# Patient Record
Sex: FEMALE | Race: WHITE | Employment: OTHER | URBAN - METROPOLITAN AREA
[De-identification: names, ages, dates, MRNs, and addresses within clinical notes are randomized per-mention and may not be internally consistent; named-entity substitution may affect disease eponyms.]

---

## 2017-11-09 ENCOUNTER — OFFICE VISIT (OUTPATIENT)
Dept: NEUROLOGY | Age: 77
End: 2017-11-09
Payer: MEDICARE

## 2017-11-09 VITALS
WEIGHT: 124 LBS | DIASTOLIC BLOOD PRESSURE: 66 MMHG | HEART RATE: 61 BPM | SYSTOLIC BLOOD PRESSURE: 114 MMHG | HEIGHT: 63 IN | BODY MASS INDEX: 21.97 KG/M2

## 2017-11-09 DIAGNOSIS — M47.816 SPONDYLOSIS OF LUMBAR REGION WITHOUT MYELOPATHY OR RADICULOPATHY: Primary | ICD-10-CM

## 2017-11-09 PROCEDURE — 4040F PNEUMOC VAC/ADMIN/RCVD: CPT | Performed by: PSYCHIATRY & NEUROLOGY

## 2017-11-09 PROCEDURE — 99214 OFFICE O/P EST MOD 30 MIN: CPT | Performed by: PSYCHIATRY & NEUROLOGY

## 2017-11-09 PROCEDURE — G8427 DOCREV CUR MEDS BY ELIG CLIN: HCPCS | Performed by: PSYCHIATRY & NEUROLOGY

## 2017-11-09 PROCEDURE — G8484 FLU IMMUNIZE NO ADMIN: HCPCS | Performed by: PSYCHIATRY & NEUROLOGY

## 2017-11-09 PROCEDURE — 1036F TOBACCO NON-USER: CPT | Performed by: PSYCHIATRY & NEUROLOGY

## 2017-11-09 PROCEDURE — G8400 PT W/DXA NO RESULTS DOC: HCPCS | Performed by: PSYCHIATRY & NEUROLOGY

## 2017-11-09 PROCEDURE — G8420 CALC BMI NORM PARAMETERS: HCPCS | Performed by: PSYCHIATRY & NEUROLOGY

## 2017-11-09 PROCEDURE — 1123F ACP DISCUSS/DSCN MKR DOCD: CPT | Performed by: PSYCHIATRY & NEUROLOGY

## 2017-11-09 PROCEDURE — 1090F PRES/ABSN URINE INCON ASSESS: CPT | Performed by: PSYCHIATRY & NEUROLOGY

## 2017-11-09 RX ORDER — GABAPENTIN 300 MG/1
CAPSULE ORAL
Qty: 240 CAPSULE | Refills: 11 | Status: SHIPPED | OUTPATIENT
Start: 2017-11-09 | End: 2018-11-15 | Stop reason: SDUPTHER

## 2017-11-09 ASSESSMENT — ENCOUNTER SYMPTOMS
EYES NEGATIVE: 1
GASTROINTESTINAL NEGATIVE: 1
RESPIRATORY NEGATIVE: 1
BACK PAIN: 1

## 2017-11-09 NOTE — PROGRESS NOTES
tablet Take 25 mg by mouth 2 times daily      aspirin 81 MG tablet Take 81 mg by mouth daily as needed       VITAMIN B1-B12 PO Take 2 tablets by mouth 2 times daily.  acetaminophen (TYLENOL) 500 MG tablet Take 500 mg by mouth daily as needed for Pain      gabapentin (NEURONTIN) 300 MG capsule Take 2 capsules by mouth 4 times daily. 240 capsule 1     No current facility-administered medications for this visit. Allergies   Allergen Reactions    Sulfa Antibiotics            Review of Systems   Constitutional: Negative. HENT: Positive for hearing loss. Eyes: Negative. Respiratory: Negative. Cardiovascular: Positive for leg swelling. Gastrointestinal: Negative. Endocrine: Negative. Genitourinary: Negative. Musculoskeletal: Positive for back pain and neck pain. Skin: Negative. Neurological: Negative. Hematological: Negative. Psychiatric/Behavioral: Negative. Objective:   Physical Exam    Vitals:    11/09/17 0953   BP: 114/66   Pulse: 61       Neurological Examination  Constitutional .General exam well groomed   Ears /Nose/Throat: external ear . Normal exam  Neck and thyroid . Normal size  Respiratory . Breathsounds clear bilaterally  Cardiovascular: Auscultation of heart with regular rate and rhythm and normal heart sounds  Musculoskeletal. Muscle bulk and tone normal                                                                 Muscle strength 5/5 strength throughout                                                                                 No dysmetria or dysdiadokinesis  No tremor   Normal fine motor  Gait normal   Orientation Alert and oriented x 3   Short term memory normal  Attention and concentration normal   Language process and speech normal . No aphasia   Cranial nerve 2 normal acuety and visual fields  Cranial nerve 3, 4 and 6 . Extraocular muscles are intact . Pupils are equal and reactive   Cranial nerve 5 . Normal strength of masseter and temporalis . Intact corneal reflex. Normal facial sensation  Cranial nerve 7 normal exam   Cranial nerve 8. Grossly intact hearing   Cranial nerve 9 and 10. Symmetric palate elevation   Cranial nerve 11 , 5 out of 5 strength   Cranial Nerve 12 midline tongue . No atrophy  Sensation . Normal proprioception . Intact Vibration . Normal pinprick and light touch   Deep Tendon Reflexes normal  Plantar response flexor bilaterally      Assessment:      1.  Spondylosis of lumbar region without myelopathy or radiculopathy      Patient is to remain on current medication regimen       Plan:      As above

## 2017-11-09 NOTE — LETTER
Social History Main Topics    Smoking status: Never Smoker    Smokeless tobacco: Never Used    Alcohol use No    Drug use: No    Sexual activity: Not Asked     Other Topics Concern    None     Social History Narrative    None       Current Outpatient Prescriptions   Medication Sig Dispense Refill    gabapentin (NEURONTIN) 300 MG capsule Take 2 capsules by mouth 4 times daily. 240 capsule 11    spironolactone (ALDACTONE) 25 MG tablet Take 25 mg by mouth 2 times daily      aspirin 81 MG tablet Take 81 mg by mouth daily as needed       VITAMIN B1-B12 PO Take 2 tablets by mouth 2 times daily.  acetaminophen (TYLENOL) 500 MG tablet Take 500 mg by mouth daily as needed for Pain      gabapentin (NEURONTIN) 300 MG capsule Take 2 capsules by mouth 4 times daily. 240 capsule 1     No current facility-administered medications for this visit. Allergies   Allergen Reactions    Sulfa Antibiotics            Review of Systems   Constitutional: Negative. HENT: Positive for hearing loss. Eyes: Negative. Respiratory: Negative. Cardiovascular: Positive for leg swelling. Gastrointestinal: Negative. Endocrine: Negative. Genitourinary: Negative. Musculoskeletal: Positive for back pain and neck pain. Skin: Negative. Neurological: Negative. Hematological: Negative. Psychiatric/Behavioral: Negative. Objective:   Physical Exam    Vitals:    11/09/17 0953   BP: 114/66   Pulse: 61       Neurological Examination  Constitutional .General exam well groomed   Ears /Nose/Throat: external ear . Normal exam  Neck and thyroid . Normal size  Respiratory . Breathsounds clear bilaterally  Cardiovascular:  Auscultation of heart with regular rate and rhythm and normal heart sounds  Musculoskeletal. Muscle bulk and tone normal                                                                 Muscle strength 5/5 strength throughout

## 2017-11-14 NOTE — COMMUNICATION BODY
. Intact corneal reflex. Normal facial sensation  Cranial nerve 7 normal exam   Cranial nerve 8. Grossly intact hearing   Cranial nerve 9 and 10. Symmetric palate elevation   Cranial nerve 11 , 5 out of 5 strength   Cranial Nerve 12 midline tongue . No atrophy  Sensation . Normal proprioception . Intact Vibration . Normal pinprick and light touch   Deep Tendon Reflexes normal  Plantar response flexor bilaterally      Assessment:      1.  Spondylosis of lumbar region without myelopathy or radiculopathy      Patient is to remain on current medication regimen       Plan:      As above

## 2018-11-15 ENCOUNTER — OFFICE VISIT (OUTPATIENT)
Dept: NEUROLOGY | Age: 78
End: 2018-11-15
Payer: MEDICARE

## 2018-11-15 VITALS
BODY MASS INDEX: 23.6 KG/M2 | SYSTOLIC BLOOD PRESSURE: 124 MMHG | HEIGHT: 61 IN | HEART RATE: 70 BPM | WEIGHT: 125 LBS | DIASTOLIC BLOOD PRESSURE: 67 MMHG

## 2018-11-15 DIAGNOSIS — M47.816 SPONDYLOSIS OF LUMBAR REGION WITHOUT MYELOPATHY OR RADICULOPATHY: Primary | ICD-10-CM

## 2018-11-15 PROCEDURE — 99214 OFFICE O/P EST MOD 30 MIN: CPT | Performed by: PSYCHIATRY & NEUROLOGY

## 2018-11-15 PROCEDURE — G8400 PT W/DXA NO RESULTS DOC: HCPCS | Performed by: PSYCHIATRY & NEUROLOGY

## 2018-11-15 PROCEDURE — 4040F PNEUMOC VAC/ADMIN/RCVD: CPT | Performed by: PSYCHIATRY & NEUROLOGY

## 2018-11-15 PROCEDURE — 1036F TOBACCO NON-USER: CPT | Performed by: PSYCHIATRY & NEUROLOGY

## 2018-11-15 PROCEDURE — G8420 CALC BMI NORM PARAMETERS: HCPCS | Performed by: PSYCHIATRY & NEUROLOGY

## 2018-11-15 PROCEDURE — 1101F PT FALLS ASSESS-DOCD LE1/YR: CPT | Performed by: PSYCHIATRY & NEUROLOGY

## 2018-11-15 PROCEDURE — 1090F PRES/ABSN URINE INCON ASSESS: CPT | Performed by: PSYCHIATRY & NEUROLOGY

## 2018-11-15 PROCEDURE — G8484 FLU IMMUNIZE NO ADMIN: HCPCS | Performed by: PSYCHIATRY & NEUROLOGY

## 2018-11-15 PROCEDURE — 1123F ACP DISCUSS/DSCN MKR DOCD: CPT | Performed by: PSYCHIATRY & NEUROLOGY

## 2018-11-15 PROCEDURE — G8427 DOCREV CUR MEDS BY ELIG CLIN: HCPCS | Performed by: PSYCHIATRY & NEUROLOGY

## 2018-11-15 RX ORDER — GABAPENTIN 300 MG/1
CAPSULE ORAL
Qty: 240 CAPSULE | Refills: 11 | Status: SHIPPED | OUTPATIENT
Start: 2018-11-15 | End: 2019-11-19 | Stop reason: SDUPTHER

## 2018-11-15 ASSESSMENT — ENCOUNTER SYMPTOMS
BACK PAIN: 1
CONSTIPATION: 1
EYES NEGATIVE: 1
RESPIRATORY NEGATIVE: 1

## 2018-11-15 NOTE — LETTER
Social History    Marital status:      Spouse name: N/A    Number of children: N/A    Years of education: N/A     Social History Main Topics    Smoking status: Never Smoker    Smokeless tobacco: Never Used    Alcohol use No    Drug use: No    Sexual activity: Not Asked     Other Topics Concern    None     Social History Narrative    None       Current Outpatient Prescriptions   Medication Sig Dispense Refill    gabapentin (NEURONTIN) 300 MG capsule Take 2 capsules by mouth 4 times daily. . 240 capsule 11    spironolactone (ALDACTONE) 25 MG tablet Take 25 mg by mouth 2 times daily      acetaminophen (TYLENOL) 500 MG tablet Take 500 mg by mouth daily as needed for Pain      gabapentin (NEURONTIN) 300 MG capsule Take 2 capsules by mouth 4 times daily. 240 capsule 1    aspirin 81 MG tablet Take 81 mg by mouth daily as needed       VITAMIN B1-B12 PO Take 2 tablets by mouth 2 times daily. No current facility-administered medications for this visit. Allergies   Allergen Reactions    Sulfa Antibiotics            Review of Systems   Constitutional: Positive for fever. HENT: Positive for hearing loss. Eyes: Negative. Respiratory: Negative. Cardiovascular: Negative. Gastrointestinal: Positive for constipation. Endocrine: Negative. Genitourinary: Negative. Musculoskeletal: Positive for back pain, gait problem, myalgias and neck pain. Skin: Negative. Neurological: Positive for numbness. Hematological: Negative. Psychiatric/Behavioral: Negative. Objective:   Physical Exam    Vitals:    11/15/18 1009   BP: 124/67   Pulse: 70       Neurological Examination  Constitutional .General exam well groomed   Ears /Nose/Throat: external ear . Normal exam  Neck and thyroid . Normal size  Respiratory . Breathsounds clear bilaterally  Cardiovascular:  Auscultation of heart with regular rate and rhythm and normal heart sounds

## 2018-11-15 NOTE — PROGRESS NOTES
(NEURONTIN) 300 MG capsule Take 2 capsules by mouth 4 times daily. . 240 capsule 11    spironolactone (ALDACTONE) 25 MG tablet Take 25 mg by mouth 2 times daily      acetaminophen (TYLENOL) 500 MG tablet Take 500 mg by mouth daily as needed for Pain      gabapentin (NEURONTIN) 300 MG capsule Take 2 capsules by mouth 4 times daily. 240 capsule 1    aspirin 81 MG tablet Take 81 mg by mouth daily as needed       VITAMIN B1-B12 PO Take 2 tablets by mouth 2 times daily. No current facility-administered medications for this visit. Allergies   Allergen Reactions    Sulfa Antibiotics            Review of Systems   Constitutional: Positive for fever. HENT: Positive for hearing loss. Eyes: Negative. Respiratory: Negative. Cardiovascular: Negative. Gastrointestinal: Positive for constipation. Endocrine: Negative. Genitourinary: Negative. Musculoskeletal: Positive for back pain, gait problem, myalgias and neck pain. Skin: Negative. Neurological: Positive for numbness. Hematological: Negative. Psychiatric/Behavioral: Negative. Objective:   Physical Exam    Vitals:    11/15/18 1009   BP: 124/67   Pulse: 70       Neurological Examination  Constitutional .General exam well groomed   Ears /Nose/Throat: external ear . Normal exam  Neck and thyroid . Normal size  Respiratory . Breathsounds clear bilaterally  Cardiovascular:  Auscultation of heart with regular rate and rhythm and normal heart sounds  Musculoskeletal. Muscle bulk and tone normal                                                                 Muscle strength 5/5 strength throughout                                                                                 No dysmetria or dysdiadokinesis  No tremor   Normal fine motor  Gait normal   Orientation Alert and oriented x 3   Short term memory normal  Attention and concentration normal   Language process and speech normal . No aphasia   Cranial nerve 2 normal acuety and visual fields  Cranial nerve 3, 4 and 6 . Extraocular muscles are intact . Pupils are equal and reactive   Cranial nerve 5 . Normal strength of masseter and temporalis . Intact corneal reflex. Normal facial sensation  Cranial nerve 7 normal exam   Cranial nerve 8. Grossly intact hearing   Cranial nerve 9 and 10. Symmetric palate elevation   Cranial nerve 11 , 5 out of 5 strength   Cranial Nerve 12 midline tongue . No atrophy  Sensation . Normal proprioception . Intact Vibration . Normal pinprick and light touch   Deep Tendon Reflexes normal  Plantar response flexor bilaterally      Assessment:      1.  Spondylosis of lumbar region without myelopathy or radiculopathy      Patient is to remain on current medication regimen       Plan:      As above

## 2019-11-19 ENCOUNTER — OFFICE VISIT (OUTPATIENT)
Dept: NEUROLOGY | Age: 79
End: 2019-11-19
Payer: MEDICARE

## 2019-11-19 VITALS
WEIGHT: 123.4 LBS | HEART RATE: 67 BPM | DIASTOLIC BLOOD PRESSURE: 70 MMHG | SYSTOLIC BLOOD PRESSURE: 116 MMHG | HEIGHT: 63 IN | BODY MASS INDEX: 21.86 KG/M2

## 2019-11-19 DIAGNOSIS — M47.816 SPONDYLOSIS OF LUMBAR REGION WITHOUT MYELOPATHY OR RADICULOPATHY: Primary | ICD-10-CM

## 2019-11-19 PROCEDURE — 1123F ACP DISCUSS/DSCN MKR DOCD: CPT | Performed by: PSYCHIATRY & NEUROLOGY

## 2019-11-19 PROCEDURE — 99214 OFFICE O/P EST MOD 30 MIN: CPT | Performed by: PSYCHIATRY & NEUROLOGY

## 2019-11-19 PROCEDURE — 1036F TOBACCO NON-USER: CPT | Performed by: PSYCHIATRY & NEUROLOGY

## 2019-11-19 PROCEDURE — G8420 CALC BMI NORM PARAMETERS: HCPCS | Performed by: PSYCHIATRY & NEUROLOGY

## 2019-11-19 PROCEDURE — G8484 FLU IMMUNIZE NO ADMIN: HCPCS | Performed by: PSYCHIATRY & NEUROLOGY

## 2019-11-19 PROCEDURE — 1090F PRES/ABSN URINE INCON ASSESS: CPT | Performed by: PSYCHIATRY & NEUROLOGY

## 2019-11-19 PROCEDURE — 4040F PNEUMOC VAC/ADMIN/RCVD: CPT | Performed by: PSYCHIATRY & NEUROLOGY

## 2019-11-19 PROCEDURE — G8400 PT W/DXA NO RESULTS DOC: HCPCS | Performed by: PSYCHIATRY & NEUROLOGY

## 2019-11-19 PROCEDURE — G8427 DOCREV CUR MEDS BY ELIG CLIN: HCPCS | Performed by: PSYCHIATRY & NEUROLOGY

## 2019-11-19 RX ORDER — GABAPENTIN 300 MG/1
CAPSULE ORAL
Qty: 240 CAPSULE | Refills: 11 | Status: SHIPPED | OUTPATIENT
Start: 2019-11-19 | End: 2020-11-19 | Stop reason: SDUPTHER

## 2019-11-19 ASSESSMENT — ENCOUNTER SYMPTOMS
RESPIRATORY NEGATIVE: 1
EYES NEGATIVE: 1
DIARRHEA: 1
BACK PAIN: 1

## 2020-11-19 ENCOUNTER — OFFICE VISIT (OUTPATIENT)
Dept: NEUROLOGY | Age: 80
End: 2020-11-19
Payer: MEDICARE

## 2020-11-19 VITALS
DIASTOLIC BLOOD PRESSURE: 64 MMHG | HEIGHT: 61 IN | BODY MASS INDEX: 23.03 KG/M2 | WEIGHT: 122 LBS | HEART RATE: 69 BPM | SYSTOLIC BLOOD PRESSURE: 112 MMHG | TEMPERATURE: 97.6 F

## 2020-11-19 PROCEDURE — 1123F ACP DISCUSS/DSCN MKR DOCD: CPT | Performed by: PSYCHIATRY & NEUROLOGY

## 2020-11-19 PROCEDURE — G8400 PT W/DXA NO RESULTS DOC: HCPCS | Performed by: PSYCHIATRY & NEUROLOGY

## 2020-11-19 PROCEDURE — 1090F PRES/ABSN URINE INCON ASSESS: CPT | Performed by: PSYCHIATRY & NEUROLOGY

## 2020-11-19 PROCEDURE — G8420 CALC BMI NORM PARAMETERS: HCPCS | Performed by: PSYCHIATRY & NEUROLOGY

## 2020-11-19 PROCEDURE — 1036F TOBACCO NON-USER: CPT | Performed by: PSYCHIATRY & NEUROLOGY

## 2020-11-19 PROCEDURE — 4040F PNEUMOC VAC/ADMIN/RCVD: CPT | Performed by: PSYCHIATRY & NEUROLOGY

## 2020-11-19 PROCEDURE — G8427 DOCREV CUR MEDS BY ELIG CLIN: HCPCS | Performed by: PSYCHIATRY & NEUROLOGY

## 2020-11-19 PROCEDURE — 99214 OFFICE O/P EST MOD 30 MIN: CPT | Performed by: PSYCHIATRY & NEUROLOGY

## 2020-11-19 PROCEDURE — G8484 FLU IMMUNIZE NO ADMIN: HCPCS | Performed by: PSYCHIATRY & NEUROLOGY

## 2020-11-19 RX ORDER — ASPIRIN 325 MG
325 TABLET ORAL PRN
COMMUNITY

## 2020-11-19 RX ORDER — ALENDRONATE SODIUM 70 MG/1
TABLET ORAL
COMMUNITY
Start: 2020-09-03

## 2020-11-19 RX ORDER — GABAPENTIN 300 MG/1
CAPSULE ORAL
Qty: 240 CAPSULE | Refills: 5 | Status: SHIPPED | OUTPATIENT
Start: 2020-11-19 | End: 2021-05-10

## 2020-11-19 ASSESSMENT — ENCOUNTER SYMPTOMS
BACK PAIN: 1
RESPIRATORY NEGATIVE: 1
EYES NEGATIVE: 1
DIARRHEA: 1

## 2020-11-19 NOTE — PROGRESS NOTES
Subjective:      Patient ID: Lino Nguyễn is a 78 y.o. female. HPI  Active problem lumbar spondylosis on neurontin . The condition is she reports that her pain in low back is at mild degree on neurontin . The pain is in mid low back area with no leg radiation which may be aggravated with activity . She does find that neurontin will attenuate pain at 600 mg po qid tolerating this well . If she forgets to take medication low back pain pain will get worse not wanting to cut back on dosage . Pain has not been responsive to physical therapy or TENS unit . There is no leg weakness or numbness although may have some urinary hesitation. There is intermittent tingling of her legs able to shake off  There is occasional nocturnal leg cramp . Significant medications neurontin 600 mg po qid . Testing MRI of lumbar spine with L4-5 disc degeneration with bulging with mild foraminal narrowing with posterior facet arthropathy .  At L5-S1 there is severe degeneration with annular bulging , October 2008      Past Medical History:   Diagnosis Date    Cataract     COVID-19 11/01/2020    Lumbosacral spondylosis without myelopathy     Osteoporosis        Past Surgical History:   Procedure Laterality Date    HYSTERECTOMY      OTHER SURGICAL HISTORY  July 2012    for sciatica    THYROID SURGERY  08/14/2018       Family History   Problem Relation Age of Onset    Diabetes Mother     Diabetes Sister     Diabetes Brother     Stroke Brother     Diabetes Son        Social History     Socioeconomic History    Marital status:      Spouse name: None    Number of children: None    Years of education: None    Highest education level: None   Occupational History    None   Social Needs    Financial resource strain: None    Food insecurity     Worry: None     Inability: None    Transportation needs     Medical: None     Non-medical: None   Tobacco Use    Smoking status: Never Smoker    Smokeless tobacco: Never Used gait problem, myalgias and neck pain. Skin: Negative. Neurological: Positive for numbness. Hematological: Negative. Psychiatric/Behavioral: Negative. Objective:   Physical Exam  Vitals:    11/19/20 1020   BP: 112/64   Pulse: 69   Temp: 97.6 °F (36.4 °C)       Neurological Examination  Constitutional .General exam well groomed   Ears /Nose/Throat: external ear . Normal exam  Neck and thyroid . Normal size  Respiratory . Breathsounds clear bilaterally  Cardiovascular: Auscultation of heart with regular rate and rhythm and normal heart sounds  Musculoskeletal. Muscle bulk and tone normal                                                                 Muscle strength 5/5 strength throughout                                                                                 No dysmetria or dysdiadokinesis  No tremor   Normal fine motor  Gait normal   Orientation Alert and oriented x 3   Short term memory normal  Attention and concentration normal   Language process and speech normal . No aphasia   Cranial nerve 2 normal acuety and visual fields  Cranial nerve 3, 4 and 6 . Extraocular muscles are intact . Pupils are equal and reactive   Cranial nerve 5 . Normal strength of masseter and temporalis . Intact corneal reflex. Normal facial sensation  Cranial nerve 7 normal exam   Cranial nerve 8. Grossly intact hearing   Cranial nerve 9 and 10. Symmetric palate elevation   Cranial nerve 11 , 5 out of 5 strength   Cranial Nerve 12 midline tongue . No atrophy  Sensation . Normal proprioception . Intact Vibration . Normal pinprick and light touch   Deep Tendon Reflexes normal  Plantar response flexor bilaterally      Assessment:      1. Spondylosis of lumbar region without myelopathy or radiculopathy    2.  Low back pain with sciatica, sciatica laterality unspecified, unspecified back pain laterality, unspecified chronicity      Patient is to remain on current medication regimen       Plan:      As above

## 2021-05-10 NOTE — TELEPHONE ENCOUNTER
Pharmacy requesting refill of Gabapentin.       Medication active on med list: yes      Date of last fill: 11/19/20 for #240 and 5 refills    verified on 5/10/2021    verified by Thomas Gonzalez LPN      Date of last appointment: 11/19/2020    Next Visit Date: 11/18/2021

## 2021-05-11 RX ORDER — GABAPENTIN 300 MG/1
CAPSULE ORAL
Qty: 240 CAPSULE | Refills: 5 | Status: SHIPPED | OUTPATIENT
Start: 2021-05-18 | End: 2021-12-23 | Stop reason: SDUPTHER

## 2021-12-23 RX ORDER — GABAPENTIN 300 MG/1
600 CAPSULE ORAL 4 TIMES DAILY
Qty: 720 CAPSULE | Refills: 0 | Status: SHIPPED | OUTPATIENT
Start: 2021-12-23 | End: 2022-03-07 | Stop reason: SDUPTHER

## 2021-12-23 NOTE — TELEPHONE ENCOUNTER
Patient calling for refill of Gabapentin. Medication active on med list yes      Date of last fill: 5/18/21  verified on 12/23/2021   verified by [facility] LPN      Date of last appointment 11/19/2020    Next Visit Date:  3/7/2022    We rescheduled pt's appt from today. She is in need of her Gabapentin.

## 2021-12-28 ENCOUNTER — VIRTUAL VISIT (OUTPATIENT)
Dept: NEUROLOGY | Age: 81
End: 2021-12-28
Payer: MEDICARE

## 2021-12-28 DIAGNOSIS — M54.40 LOW BACK PAIN WITH SCIATICA, SCIATICA LATERALITY UNSPECIFIED, UNSPECIFIED BACK PAIN LATERALITY, UNSPECIFIED CHRONICITY: ICD-10-CM

## 2021-12-28 DIAGNOSIS — M47.816 LUMBAR SPONDYLOSIS: Primary | ICD-10-CM

## 2021-12-28 PROCEDURE — 99214 OFFICE O/P EST MOD 30 MIN: CPT | Performed by: PSYCHIATRY & NEUROLOGY

## 2021-12-28 ASSESSMENT — ENCOUNTER SYMPTOMS
BACK PAIN: 1
EYES NEGATIVE: 1
DIARRHEA: 1
RESPIRATORY NEGATIVE: 1

## 2021-12-28 NOTE — PROGRESS NOTES
Never used   Substance and Sexual Activity    Alcohol use: No     Alcohol/week: 0.0 standard drinks    Drug use: No    Sexual activity: None   Other Topics Concern    None   Social History Narrative    None     Social Determinants of Health     Financial Resource Strain:     Difficulty of Paying Living Expenses: Not on file   Food Insecurity:     Worried About Running Out of Food in the Last Year: Not on file    Petra of Food in the Last Year: Not on file   Transportation Needs:     Lack of Transportation (Medical): Not on file    Lack of Transportation (Non-Medical): Not on file   Physical Activity:     Days of Exercise per Week: Not on file    Minutes of Exercise per Session: Not on file   Stress:     Feeling of Stress : Not on file   Social Connections:     Frequency of Communication with Friends and Family: Not on file    Frequency of Social Gatherings with Friends and Family: Not on file    Attends Alevism Services: Not on file    Active Member of [address] [place] [address] or Organizations: Not on file    Attends Club or Organization Meetings: Not on file    Marital Status: Not on file   Intimate Partner Violence:     Fear of Current or Ex-Partner: Not on file    Emotionally Abused: Not on file    Physically Abused: Not on file    Sexually Abused: Not on file   Housing Stability:     Unable to Pay for Housing in the Last Year: Not on file    Number of [place] in the Last Year: Not on file    Unstable Housing in the Last Year: Not on file       Current Outpatient Medications   Medication Sig Dispense Refill    gabapentin (NEURONTIN) 300 MG capsule Take 2 capsules by mouth 4 times daily for 90 days.  720 capsule 0    aspirin 325 MG tablet Take 325 mg by mouth as needed for Pain      SIMETHICONE 40 MG/0.6 ML ORAL SUSP CMPD Take 1 tablet by mouth 4 times daily as needed      spironolactone (ALDACTONE) 25 MG tablet Take 25 mg by mouth 2 times daily      acetaminophen (TYLENOL) 500 MG tablet Take 500 mg by mouth daily as needed for Pain      VITAMIN B1-B12 PO Take 2 tablets by mouth 2 times daily.  alendronate (FOSAMAX) 70 MG tablet TAKE ONE TABLET ONCE WEEKLY ON AN EMPTY STOMACH, WITH FULL GLASS OF WATER. DON'T EAT OR LIE DOWN FOR 30 MINUTES (Patient not taking: Reported on 12/27/2021)      aspirin 81 MG tablet Take 81 mg by mouth daily as needed  (Patient not taking: Reported on 12/27/2021)       No current facility-administered medications for this visit. Allergies   Allergen Reactions    Ciprofloxacin     Sulfa Antibiotics            Review of Systems   Constitutional: Negative. HENT: Positive for hearing loss. Eyes: Negative. Respiratory: Negative. Cardiovascular: Negative. Gastrointestinal: Positive for diarrhea. Endocrine: Negative. Genitourinary: Negative. Musculoskeletal: Positive for back pain, gait problem, myalgias and neck pain. Skin: Negative. Neurological: Positive for numbness. Hematological: Negative. Psychiatric/Behavioral: Negative. Objective:   Physical Exam  There were no vitals filed for this visit. Neurological Examination  Constitutional .General exam well groomed   Ears /Nose/Throat: external ear . Normal exam  Neck and thyroid . Normal size  Respiratory . Breathsounds clear bilaterally  Cardiovascular: Auscultation of heart with regular rate and rhythm and normal heart sounds  Musculoskeletal. Muscle bulk and tone normal                                                                 Muscle strength 5/5 strength throughout                                                                                 No dysmetria or dysdiadokinesis  No tremor   Normal fine motor  Gait normal   Orientation Alert and oriented x 3   Short term memory normal  Attention and concentration normal   Language process and speech normal . No aphasia   Cranial nerve 2 normal acuety and visual fields  Cranial nerve 3, 4 and 6 . Extraocular muscles are intact . Pupils are equal and reactive   Cranial nerve 5 . Normal strength of masseter and temporalis . Intact corneal reflex. Normal facial sensation  Cranial nerve 7 normal exam   Cranial nerve 8. Grossly intact hearing   Cranial nerve 9 and 10. Symmetric palate elevation   Cranial nerve 11 , 5 out of 5 strength   Cranial Nerve 12 midline tongue . No atrophy  Sensation . Normal proprioception . Intact Vibration . Normal pinprick and light touch   Deep Tendon Reflexes normal  Plantar response flexor bilaterally      Assessment:      1. Lumbar spondylosis    2. Low back pain with sciatica, sciatica laterality unspecified, unspecified back pain laterality, unspecified chronicity      Patient is to continue current medication regimen       Plan:      As above       [facility], was evaluated through a synchronous (real-time) audio-video encounter. The patient (or guardian if applicable) is aware that this is a billable service. Verbal consent to proceed has been obtained within the past 12 months. The visit was conducted pursuant to the emergency declaration under the [address] [place] [address] authority and the [employer] and [employer] General Act. Patient identification was verified, and a caregiver was present when appropriate. The patient was located in a state where the provider was credentialed to provide care. --Mackenzie Clinton MD on 12/29/2021 at 4:50 PM    An electronic signature was used to authenticate this note.

## 2022-03-07 ENCOUNTER — TELEMEDICINE (OUTPATIENT)
Dept: NEUROLOGY | Age: 82
End: 2022-03-07
Payer: MEDICARE

## 2022-03-07 DIAGNOSIS — M54.40 LOW BACK PAIN WITH SCIATICA, SCIATICA LATERALITY UNSPECIFIED, UNSPECIFIED BACK PAIN LATERALITY, UNSPECIFIED CHRONICITY: ICD-10-CM

## 2022-03-07 DIAGNOSIS — M47.816 LUMBAR SPONDYLOSIS: Primary | ICD-10-CM

## 2022-03-07 PROCEDURE — G8421 BMI NOT CALCULATED: HCPCS | Performed by: PSYCHIATRY & NEUROLOGY

## 2022-03-07 PROCEDURE — G8484 FLU IMMUNIZE NO ADMIN: HCPCS | Performed by: PSYCHIATRY & NEUROLOGY

## 2022-03-07 PROCEDURE — G8400 PT W/DXA NO RESULTS DOC: HCPCS | Performed by: PSYCHIATRY & NEUROLOGY

## 2022-03-07 PROCEDURE — G8428 CUR MEDS NOT DOCUMENT: HCPCS | Performed by: PSYCHIATRY & NEUROLOGY

## 2022-03-07 PROCEDURE — 4040F PNEUMOC VAC/ADMIN/RCVD: CPT | Performed by: PSYCHIATRY & NEUROLOGY

## 2022-03-07 PROCEDURE — 1036F TOBACCO NON-USER: CPT | Performed by: PSYCHIATRY & NEUROLOGY

## 2022-03-07 PROCEDURE — 99214 OFFICE O/P EST MOD 30 MIN: CPT | Performed by: PSYCHIATRY & NEUROLOGY

## 2022-03-07 PROCEDURE — 1123F ACP DISCUSS/DSCN MKR DOCD: CPT | Performed by: PSYCHIATRY & NEUROLOGY

## 2022-03-07 PROCEDURE — 1090F PRES/ABSN URINE INCON ASSESS: CPT | Performed by: PSYCHIATRY & NEUROLOGY

## 2022-03-07 RX ORDER — GABAPENTIN 300 MG/1
600 CAPSULE ORAL 4 TIMES DAILY
Qty: 720 CAPSULE | Refills: 0 | Status: SHIPPED | OUTPATIENT
Start: 2022-03-07 | End: 2022-05-27

## 2022-03-07 ASSESSMENT — ENCOUNTER SYMPTOMS
DIARRHEA: 1
BACK PAIN: 1
RESPIRATORY NEGATIVE: 1
EYES NEGATIVE: 1

## 2022-03-07 NOTE — PROGRESS NOTES
Subjective:      Patient ID: Anny Tello is a 78 y.o. female. HPI  Active problem lumbar spondylosis on neurontin . The condition is she is on neurontin 600mg po qid 6AM , 12 noon , 5PM and 10 PM . She reports that medication will attenuate back pain which is of burning quality although reports dosing may not always not last until next scheduled dose taking pill earlier . For the larger part she reports adequate pain control . She denies radiation of pain down leg  . She has ran out of neurontin previously reporting disruptive pain being off this medication. Pain has not been responsive to physical therapy or TENS unit . There is no leg weakness or numbness although may have some urinary hesitation. There is intermittent tingling of her legs able to shake off  There is occasional nocturnal leg cramp . Significant medications neurontin 600 mg po qid . Testing MRI of lumbar spine with L4-5 disc degeneration with bulging with mild foraminal narrowing with posterior facet arthropathy .  At L5-S1 there is severe degeneration with annular bulging , October 2008      Past Medical History:   Diagnosis Date    Cataract     COVID-19 11/01/2020    Lumbosacral spondylosis without myelopathy     Osteoporosis        Past Surgical History:   Procedure Laterality Date    HYSTERECTOMY      OTHER SURGICAL HISTORY  July 2012    for sciatica    THYROID SURGERY  08/14/2018       Family History   Problem Relation Age of Onset    Diabetes Mother     Diabetes Sister     Diabetes Brother     Stroke Brother     Diabetes Son        Social History     Socioeconomic History    Marital status:      Spouse name: Not on file    Number of children: Not on file    Years of education: Not on file    Highest education level: Not on file   Occupational History    Not on file   Tobacco Use    Smoking status: Never Smoker    Smokeless tobacco: Never Used   Vaping Use    Vaping Use: Never used   Substance and Sexual Activity  Alcohol use: No     Alcohol/week: 0.0 standard drinks    Drug use: No    Sexual activity: Not on file   Other Topics Concern    Not on file   Social History Narrative    Not on file     Social Determinants of Health     Financial Resource Strain:     Difficulty of Paying Living Expenses: Not on file   Food Insecurity:     Worried About Running Out of Food in the Last Year: Not on file    Petra of Food in the Last Year: Not on file   Transportation Needs:     Lack of Transportation (Medical): Not on file    Lack of Transportation (Non-Medical): Not on file   Physical Activity:     Days of Exercise per Week: Not on file    Minutes of Exercise per Session: Not on file   Stress:     Feeling of Stress : Not on file   Social Connections:     Frequency of Communication with Friends and Family: Not on file    Frequency of Social Gatherings with Friends and Family: Not on file    Attends Christian Services: Not on file    Active Member of [address] [place] [address] or Organizations: Not on file    Attends Club or Organization Meetings: Not on file    Marital Status: Not on file   Intimate Partner Violence:     Fear of Current or Ex-Partner: Not on file    Emotionally Abused: Not on file    Physically Abused: Not on file    Sexually Abused: Not on file   Housing Stability:     Unable to Pay for Housing in the Last Year: Not on file    Number of [place] in the Last Year: Not on file    Unstable Housing in the Last Year: Not on file       Current Outpatient Medications   Medication Sig Dispense Refill    gabapentin (NEURONTIN) 300 MG capsule Take 2 capsules by mouth 4 times daily for 90 days. 720 capsule 0    aspirin 325 MG tablet Take 325 mg by mouth as needed for Pain      alendronate (FOSAMAX) 70 MG tablet TAKE ONE TABLET ONCE WEEKLY ON AN EMPTY STOMACH, WITH FULL GLASS OF WATER.  DON'T EAT OR LIE DOWN FOR 30 MINUTES (Patient not taking: Reported on 12/27/2021)      SIMETHICONE 40 MG/0.6 ML ORAL SUSP CMPD Take 1 tablet by mouth 4 times daily as needed      spironolactone (ALDACTONE) 25 MG tablet Take 25 mg by mouth 2 times daily      acetaminophen (TYLENOL) 500 MG tablet Take 500 mg by mouth daily as needed for Pain      aspirin 81 MG tablet Take 81 mg by mouth daily as needed  (Patient not taking: Reported on 12/27/2021)      VITAMIN B1-B12 PO Take 2 tablets by mouth 2 times daily. No current facility-administered medications for this visit. Allergies   Allergen Reactions    Ciprofloxacin     Sulfa Antibiotics            Review of Systems   Constitutional: Negative. HENT: Positive for hearing loss. Eyes: Negative. Respiratory: Negative. Cardiovascular: Negative. Gastrointestinal: Positive for diarrhea. Endocrine: Negative. Genitourinary: Negative. Musculoskeletal: Positive for back pain, gait problem, myalgias and neck pain. Skin: Negative. Neurological: Positive for numbness. Hematological: Negative. Psychiatric/Behavioral: Negative. Objective:   Physical Exam  There were no vitals filed for this visit. Neurological Examination  Constitutional .General exam well groomed   Ears /Nose/Throat: external ear . Normal exam  Neck and thyroid . Normal size  Respiratory . Breathsounds clear bilaterally  Cardiovascular: Auscultation of heart with regular rate and rhythm and normal heart sounds  Musculoskeletal. Muscle bulk and tone normal                                                                 Muscle strength 5/5 strength throughout                                                                                 No dysmetria or dysdiadokinesis  No tremor   Normal fine motor  Gait normal   Orientation Alert and oriented x 3   Short term memory normal  Attention and concentration normal   Language process and speech normal . No aphasia   Cranial nerve 2 normal acuety and visual fields  Cranial nerve 3, 4 and 6 . Extraocular muscles are intact .  Pupils are equal

## 2022-05-20 NOTE — TELEPHONE ENCOUNTER
Pharmacy requesting refill of Neurontin 600mg po qid      Medication active on med list yes      Date of last Rx: 03/07/2022 with 0 refills (3 mo supply)         verified by ROSE MARY ORTIZ      Date of last appointment 03/07/2022    Next Visit Date:  9/12/2022    RX to be filled on 6/5/2022

## 2022-05-27 RX ORDER — GABAPENTIN 300 MG/1
CAPSULE ORAL
Qty: 720 CAPSULE | Refills: 0 | Status: SHIPPED | OUTPATIENT
Start: 2022-05-27 | End: 2022-09-12 | Stop reason: SDUPTHER

## 2022-09-12 ENCOUNTER — TELEMEDICINE (OUTPATIENT)
Dept: NEUROLOGY | Age: 82
End: 2022-09-12
Payer: MEDICARE

## 2022-09-12 DIAGNOSIS — M54.40 LOW BACK PAIN WITH SCIATICA, SCIATICA LATERALITY UNSPECIFIED, UNSPECIFIED BACK PAIN LATERALITY, UNSPECIFIED CHRONICITY: ICD-10-CM

## 2022-09-12 DIAGNOSIS — M47.816 LUMBAR SPONDYLOSIS: Primary | ICD-10-CM

## 2022-09-12 PROCEDURE — G8421 BMI NOT CALCULATED: HCPCS | Performed by: PSYCHIATRY & NEUROLOGY

## 2022-09-12 PROCEDURE — G8400 PT W/DXA NO RESULTS DOC: HCPCS | Performed by: PSYCHIATRY & NEUROLOGY

## 2022-09-12 PROCEDURE — G8427 DOCREV CUR MEDS BY ELIG CLIN: HCPCS | Performed by: PSYCHIATRY & NEUROLOGY

## 2022-09-12 PROCEDURE — 1036F TOBACCO NON-USER: CPT | Performed by: PSYCHIATRY & NEUROLOGY

## 2022-09-12 PROCEDURE — 1090F PRES/ABSN URINE INCON ASSESS: CPT | Performed by: PSYCHIATRY & NEUROLOGY

## 2022-09-12 PROCEDURE — 99214 OFFICE O/P EST MOD 30 MIN: CPT | Performed by: PSYCHIATRY & NEUROLOGY

## 2022-09-12 PROCEDURE — 1123F ACP DISCUSS/DSCN MKR DOCD: CPT | Performed by: PSYCHIATRY & NEUROLOGY

## 2022-09-12 RX ORDER — GABAPENTIN 300 MG/1
CAPSULE ORAL
Qty: 720 CAPSULE | Refills: 0 | Status: SHIPPED | OUTPATIENT
Start: 2022-09-12 | End: 2022-12-11

## 2022-09-12 ASSESSMENT — ENCOUNTER SYMPTOMS
RESPIRATORY NEGATIVE: 1
EYES NEGATIVE: 1
BACK PAIN: 1
DIARRHEA: 1

## 2022-09-12 NOTE — PROGRESS NOTES
Subjective:      Patient ID: Olegario Lagos is a 78 y.o. female. HPI  Active problem lumbar spondylosis on neurontin . The condition is she reports that low back pain is largely controlled on neurontin being quite comfortable. She is on neurontin 600mg po qid 6AM , 12 noon , 5PM and 10 PM . She see clear difference on this medication having marked diffuse pain when she ran out of medication in December also reporting some pain breakthrough if she misses pill reporting pian to be of burning . For the larger part she reports adequate pain control . She denies radiation of pain down leg  . Pain has not been responsive to physical therapy or TENS unit . There is no leg weakness or numbness although may have some urinary hesitation. There is intermittent tingling of her legs able to shake off . There is occasional nocturnal leg cramp . Significant medications neurontin 600 mg po qid . Testing MRI of lumbar spine with L4-5 disc degeneration with bulging with mild foraminal narrowing with posterior facet arthropathy .  At L5-S1 there is severe degeneration with annular bulging , October 2008      Past Medical History:   Diagnosis Date    Cataract     COVID-19 11/01/2020    Lumbosacral spondylosis without myelopathy     Osteoporosis        Past Surgical History:   Procedure Laterality Date    HYSTERECTOMY (CERVIX STATUS UNKNOWN)      OTHER SURGICAL HISTORY  July 2012    for sciatica    THYROID SURGERY  08/14/2018       Family History   Problem Relation Age of Onset    Diabetes Mother     Diabetes Sister     Diabetes Brother     Stroke Brother     Diabetes Son        Social History     Socioeconomic History    Marital status:      Spouse name: None    Number of children: None    Years of education: None    Highest education level: None   Tobacco Use    Smoking status: Never    Smokeless tobacco: Never   Vaping Use    Vaping Use: Never used   Substance and Sexual Activity    Alcohol use: No     Alcohol/week: 0.0 standard drinks    Drug use: No       Current Outpatient Medications   Medication Sig Dispense Refill    gabapentin (NEURONTIN) 300 MG capsule TAKE TWO CAPSULES BY MOUTH FOUR TIMES DAILY 720 capsule 0    aspirin 325 MG tablet Take 325 mg by mouth as needed for Pain      SIMETHICONE 40 MG/0.6 ML ORAL SUSP CMPD Take 1 tablet by mouth 4 times daily as needed      spironolactone (ALDACTONE) 25 MG tablet Take 25 mg by mouth 2 times daily      alendronate (FOSAMAX) 70 MG tablet TAKE ONE TABLET ONCE WEEKLY ON AN EMPTY STOMACH, WITH FULL GLASS OF WATER. DON'T EAT OR LIE DOWN FOR 30 MINUTES (Patient not taking: No sig reported)      acetaminophen (TYLENOL) 500 MG tablet Take 500 mg by mouth daily as needed for Pain (Patient not taking: Reported on 9/9/2022)      aspirin 81 MG tablet Take 81 mg by mouth daily as needed  (Patient not taking: No sig reported)      VITAMIN B1-B12 PO Take 2 tablets by mouth 2 times daily. (Patient not taking: Reported on 9/9/2022)       No current facility-administered medications for this visit. Allergies   Allergen Reactions    Ciprofloxacin     Sulfa Antibiotics            Review of Systems   Constitutional: Negative. HENT:  Positive for hearing loss. Eyes: Negative. Respiratory: Negative. Cardiovascular: Negative. Gastrointestinal:  Positive for diarrhea. Endocrine: Negative. Genitourinary: Negative. Musculoskeletal:  Positive for back pain, gait problem, myalgias and neck pain. Skin: Negative. Neurological:  Positive for numbness. Hematological: Negative. Psychiatric/Behavioral: Negative. Objective:   Physical Exam  There were no vitals filed for this visit. Neurological Examination  Constitutional .General exam well groomed   Ears /Nose/Throat: external ear . Normal exam  Neck and thyroid . Normal size  Respiratory . Breathsounds clear bilaterally  Cardiovascular:  Auscultation of heart with regular rate and rhythm and normal heart sounds  Musculoskeletal. Muscle bulk and tone normal                                                                 Muscle strength 5/5 strength throughout                                                                                 No dysmetria or dysdiadokinesis  No tremor   Normal fine motor  Gait normal   Orientation Alert and oriented x 3   Short term memory normal  Attention and concentration normal   Language process and speech normal . No aphasia   Cranial nerve 2 normal acuety and visual fields  Cranial nerve 3, 4 and 6 . Extraocular muscles are intact . Pupils are equal and reactive   Cranial nerve 5 . Normal strength of masseter and temporalis . Intact corneal reflex. Normal facial sensation  Cranial nerve 7 normal exam   Cranial nerve 8. Grossly intact hearing   Cranial nerve 9 and 10. Symmetric palate elevation   Cranial nerve 11 , 5 out of 5 strength   Cranial Nerve 12 midline tongue . No atrophy  Sensation . Normal proprioception . Intact Vibration . Normal pinprick and light touch   Deep Tendon Reflexes normal  Plantar response flexor bilaterally      Assessment:      1. Lumbar spondylosis    2. Low back pain with sciatica, sciatica laterality unspecified, unspecified back pain laterality, unspecified chronicity      Patient is to continue current medication regimen       Plan:      As above       Kieran Morgan, was evaluated through a synchronous (real-time) audio-video encounter. The patient (or guardian if applicable) is aware that this is a billable service. Verbal consent to proceed has been obtained within the past 12 months. The visit was conducted pursuant to the emergency declaration under the [address] [place] [address] authority and the [employer] Act. Patient identification was verified, and a caregiver was present when appropriate.  The patient was located in a state where the provider was credentialed to provide care. --Tyra Ruiz MD on 9/12/2022 at 10:33 AM    An electronic signature was used to authenticate this note.

## 2022-12-14 RX ORDER — GABAPENTIN 300 MG/1
CAPSULE ORAL
Qty: 720 CAPSULE | Refills: 0 | Status: SHIPPED | OUTPATIENT
Start: 2022-12-14 | End: 2023-03-14

## 2022-12-14 NOTE — TELEPHONE ENCOUNTER
Pharmacy requesting refill of gabapentin (NEURONTIN) 300 MG capsule      Medication active on med list yes      Date of last Rx: 09/12/2022 with 0 refills          verified by ROSE MARY ORTIZ      Date of last appointment 09/12/2022    Next Visit Date:  3/14/2023

## 2023-03-27 ENCOUNTER — TELEMEDICINE (OUTPATIENT)
Dept: NEUROLOGY | Age: 83
End: 2023-03-27
Payer: MEDICARE

## 2023-03-27 DIAGNOSIS — M47.816 LUMBAR SPONDYLOSIS: ICD-10-CM

## 2023-03-27 DIAGNOSIS — M54.40 LOW BACK PAIN WITH SCIATICA, SCIATICA LATERALITY UNSPECIFIED, UNSPECIFIED BACK PAIN LATERALITY, UNSPECIFIED CHRONICITY: Primary | ICD-10-CM

## 2023-03-27 PROCEDURE — G8428 CUR MEDS NOT DOCUMENT: HCPCS | Performed by: PSYCHIATRY & NEUROLOGY

## 2023-03-27 PROCEDURE — G8421 BMI NOT CALCULATED: HCPCS | Performed by: PSYCHIATRY & NEUROLOGY

## 2023-03-27 PROCEDURE — 1090F PRES/ABSN URINE INCON ASSESS: CPT | Performed by: PSYCHIATRY & NEUROLOGY

## 2023-03-27 PROCEDURE — 1123F ACP DISCUSS/DSCN MKR DOCD: CPT | Performed by: PSYCHIATRY & NEUROLOGY

## 2023-03-27 PROCEDURE — 1036F TOBACCO NON-USER: CPT | Performed by: PSYCHIATRY & NEUROLOGY

## 2023-03-27 PROCEDURE — G8484 FLU IMMUNIZE NO ADMIN: HCPCS | Performed by: PSYCHIATRY & NEUROLOGY

## 2023-03-27 PROCEDURE — 99214 OFFICE O/P EST MOD 30 MIN: CPT | Performed by: PSYCHIATRY & NEUROLOGY

## 2023-03-27 PROCEDURE — G8400 PT W/DXA NO RESULTS DOC: HCPCS | Performed by: PSYCHIATRY & NEUROLOGY

## 2023-03-27 RX ORDER — GABAPENTIN 300 MG/1
CAPSULE ORAL
Qty: 240 CAPSULE | Refills: 5 | Status: SHIPPED | OUTPATIENT
Start: 2023-03-27 | End: 2023-09-25

## 2023-03-27 ASSESSMENT — ENCOUNTER SYMPTOMS
BACK PAIN: 1
EYES NEGATIVE: 1
DIARRHEA: 1
RESPIRATORY NEGATIVE: 1

## 2023-03-27 NOTE — PROGRESS NOTES
identification was verified, and a caregiver was present when appropriate. The patient was located in a state where the provider was credentialed to provide care. --Krista Vincent MD on 3/27/2023 at 4:05 PM    An electronic signature was used to authenticate this note.

## 2023-09-01 RX ORDER — GABAPENTIN 300 MG/1
CAPSULE ORAL
Qty: 240 CAPSULE | Refills: 2 | Status: SHIPPED | OUTPATIENT
Start: 2023-09-23 | End: 2023-12-01

## 2023-09-01 NOTE — TELEPHONE ENCOUNTER
Pharmacy requesting refill of gabapentin (NEURONTIN) 300 MG capsule       Medication active on med list yes      Date of last Rx: 3/27/2023 with 5 refills          verified by ROSE MARY ORTIZ      Date of last appointment 3/27/2023    Next Visit Date:  9/14/2023

## 2023-09-14 ENCOUNTER — OFFICE VISIT (OUTPATIENT)
Dept: NEUROLOGY | Age: 83
End: 2023-09-14
Payer: MEDICARE

## 2023-09-14 VITALS
BODY MASS INDEX: 22.36 KG/M2 | SYSTOLIC BLOOD PRESSURE: 112 MMHG | HEART RATE: 65 BPM | WEIGHT: 118.4 LBS | HEIGHT: 61 IN | DIASTOLIC BLOOD PRESSURE: 63 MMHG

## 2023-09-14 DIAGNOSIS — M54.40 LOW BACK PAIN WITH SCIATICA, SCIATICA LATERALITY UNSPECIFIED, UNSPECIFIED BACK PAIN LATERALITY, UNSPECIFIED CHRONICITY: Primary | ICD-10-CM

## 2023-09-14 DIAGNOSIS — M47.816 LUMBAR SPONDYLOSIS: ICD-10-CM

## 2023-09-14 PROCEDURE — 1123F ACP DISCUSS/DSCN MKR DOCD: CPT | Performed by: PSYCHIATRY & NEUROLOGY

## 2023-09-14 PROCEDURE — 1090F PRES/ABSN URINE INCON ASSESS: CPT | Performed by: PSYCHIATRY & NEUROLOGY

## 2023-09-14 PROCEDURE — G8427 DOCREV CUR MEDS BY ELIG CLIN: HCPCS | Performed by: PSYCHIATRY & NEUROLOGY

## 2023-09-14 PROCEDURE — 99214 OFFICE O/P EST MOD 30 MIN: CPT | Performed by: PSYCHIATRY & NEUROLOGY

## 2023-09-14 PROCEDURE — G8400 PT W/DXA NO RESULTS DOC: HCPCS | Performed by: PSYCHIATRY & NEUROLOGY

## 2023-09-14 PROCEDURE — G8420 CALC BMI NORM PARAMETERS: HCPCS | Performed by: PSYCHIATRY & NEUROLOGY

## 2023-09-14 PROCEDURE — 1036F TOBACCO NON-USER: CPT | Performed by: PSYCHIATRY & NEUROLOGY

## 2023-09-14 ASSESSMENT — ENCOUNTER SYMPTOMS
RESPIRATORY NEGATIVE: 1
BACK PAIN: 1
EYES NEGATIVE: 1
DIARRHEA: 1

## 2023-09-14 NOTE — PROGRESS NOTES
Subjective:      Patient ID: Maite Carrera is a 78 y.o. female. HPI  Active problem lumbar spondylosis on neurontin . The condition is she reports that low back pain is largely controlled on neurontin. She is on neurontin 600 mg po qid 6AM , 12 noon , 5PM and 10 PM . She reports occasional pain with activity such as bending of mild degree . She denies radiation of pain down leg  . Pain has not been responsive to physical therapy or TENS unit in the past . There is no leg weakness or numbness although may have some urinary hesitation. There is intermittent tingling of her legs able to shake off . There is occasional nocturnal leg cramp . Significant medications neurontin 600 mg po qid . Testing MRI of lumbar spine with L4-5 disc degeneration with bulging with mild foraminal narrowing with posterior facet arthropathy .  At L5-S1 there is severe degeneration with annular bulging , October 2008      Past Medical History:   Diagnosis Date    Cataract     COVID-19 11/01/2020    Lumbosacral spondylosis without myelopathy     Osteoporosis        Past Surgical History:   Procedure Laterality Date    HYSTERECTOMY (CERVIX STATUS UNKNOWN)      OTHER SURGICAL HISTORY  July 2012    for sciatica    THYROID SURGERY  08/14/2018       Family History   Problem Relation Age of Onset    Diabetes Mother     Diabetes Sister     Diabetes Brother     Stroke Brother     Diabetes Son        Social History     Socioeconomic History    Marital status:      Spouse name: None    Number of children: None    Years of education: None    Highest education level: None   Tobacco Use    Smoking status: Never    Smokeless tobacco: Never   Vaping Use    Vaping Use: Never used   Substance and Sexual Activity    Alcohol use: No     Alcohol/week: 0.0 standard drinks of alcohol    Drug use: No       Current Outpatient Medications   Medication Sig Dispense Refill    [START ON 9/23/2023] gabapentin (NEURONTIN) 300 MG capsule TAKE TWO CAPSULES BY MOUTH

## 2023-12-01 NOTE — TELEPHONE ENCOUNTER
Pharmacy requesting refill of gabapentin 300 mg.       Medication active on med list yes      Date of last Rx: 9/23/2023 with 2 refills          verified by SB, JUANJOA      Date of last appointment 9/14/2023    Next Visit Date:  Visit date not found

## 2023-12-02 RX ORDER — GABAPENTIN 300 MG/1
CAPSULE ORAL
Qty: 240 CAPSULE | Refills: 5 | Status: SHIPPED | OUTPATIENT
Start: 2023-12-02 | End: 2024-06-01

## 2024-05-23 ENCOUNTER — TELEPHONE (OUTPATIENT)
Dept: NEUROLOGY | Age: 84
End: 2024-05-23

## 2024-05-23 NOTE — TELEPHONE ENCOUNTER
05 23 2024 called patient times 2 (05 09 2024 and 05 16 2024 at  [phone]) to schedule follow up appointment with Dr. Andres, left message on machine both times, no response.  I mailed the patient a letter asking them to call the office back to schedule this appointment.  KS

## 2024-05-31 ENCOUNTER — TELEPHONE (OUTPATIENT)
Dept: NEUROLOGY | Age: 84
End: 2024-05-31

## 2024-05-31 NOTE — TELEPHONE ENCOUNTER
The patient called, she received our letter for a follow up appointment, and she stated that she will not be following up any longer with our office.  KS

## 2024-06-04 RX ORDER — GABAPENTIN 300 MG/1
CAPSULE ORAL
Qty: 240 CAPSULE | Refills: 5 | Status: SHIPPED | OUTPATIENT
Start: 2024-06-04 | End: 2024-12-04

## 2024-06-04 NOTE — TELEPHONE ENCOUNTER
Pharmacy requesting refill of gabapentin 300mg.      Medication active on med list yes      Date of last Rx: 12/2/2023 with 5 refills          verified by MACHELLE ATKINSON      Date of last appointment 9/14/2023    Next Visit Date:  Visit date not found